# Patient Record
Sex: MALE | Race: WHITE | Employment: UNEMPLOYED | URBAN - METROPOLITAN AREA
[De-identification: names, ages, dates, MRNs, and addresses within clinical notes are randomized per-mention and may not be internally consistent; named-entity substitution may affect disease eponyms.]

---

## 2019-12-10 ENCOUNTER — OP HISTORICAL/CONVERTED ENCOUNTER (OUTPATIENT)
Dept: OTHER | Age: 64
End: 2019-12-10

## 2022-01-07 ENCOUNTER — HOSPITAL ENCOUNTER (EMERGENCY)
Age: 67
Discharge: HOME | End: 2022-01-08
Admitting: FAMILY MEDICINE
Payer: MEDICARE

## 2022-01-07 ENCOUNTER — APPOINTMENT (OUTPATIENT)
Dept: CT IMAGING | Age: 67
End: 2022-01-07
Attending: PHYSICIAN ASSISTANT
Payer: MEDICARE

## 2022-01-07 DIAGNOSIS — S09.90XA CLOSED HEAD INJURY, INITIAL ENCOUNTER: Primary | ICD-10-CM

## 2022-01-07 PROCEDURE — 72125 CT NECK SPINE W/O DYE: CPT

## 2022-01-07 PROCEDURE — 99284 EMERGENCY DEPT VISIT MOD MDM: CPT

## 2022-01-07 PROCEDURE — 70450 CT HEAD/BRAIN W/O DYE: CPT

## 2022-01-07 PROCEDURE — 96374 THER/PROPH/DIAG INJ IV PUSH: CPT

## 2022-01-07 PROCEDURE — 74011250636 HC RX REV CODE- 250/636: Performed by: PHYSICIAN ASSISTANT

## 2022-01-07 RX ORDER — MORPHINE SULFATE 4 MG/ML
4 INJECTION INTRAVENOUS ONCE
Status: COMPLETED | OUTPATIENT
Start: 2022-01-07 | End: 2022-01-07

## 2022-01-07 RX ADMIN — MORPHINE SULFATE 4 MG: 4 INJECTION INTRAVENOUS at 23:35

## 2022-01-07 NOTE — Clinical Note
[facility] EMERGENCY DEPT  [address] [address]  [phone]    Work/School Note    Date: 1/7/2022    To Whom It May concern:    Jose Magdaleno was seen and treated today in the emergency room by the following provider(s):  Physician Assistant: Valeria Bonilla PA-C. Jose Magdaleno is excused from work/school on 1/8/2022 through 1/10/2022. He is medically clear to return to work/school on 1/11/2022.          Sincerely,          Mack Correa PA-C

## 2022-01-08 ENCOUNTER — APPOINTMENT (OUTPATIENT)
Dept: GENERAL RADIOLOGY | Age: 67
End: 2022-01-08
Attending: PHYSICIAN ASSISTANT
Payer: MEDICARE

## 2022-01-08 VITALS
OXYGEN SATURATION: 99 % | HEIGHT: 73 IN | WEIGHT: 200 LBS | BODY MASS INDEX: 26.51 KG/M2 | RESPIRATION RATE: 16 BRPM | TEMPERATURE: 98.2 F | SYSTOLIC BLOOD PRESSURE: 125 MMHG | DIASTOLIC BLOOD PRESSURE: 86 MMHG | HEART RATE: 79 BPM

## 2022-01-08 PROCEDURE — 74011250637 HC RX REV CODE- 250/637: Performed by: PHYSICIAN ASSISTANT

## 2022-01-08 PROCEDURE — 73562 X-RAY EXAM OF KNEE 3: CPT

## 2022-01-08 PROCEDURE — 74011250636 HC RX REV CODE- 250/636: Performed by: PHYSICIAN ASSISTANT

## 2022-01-08 RX ORDER — KETOROLAC TROMETHAMINE 10 MG/1
10 TABLET, FILM COATED ORAL ONCE
Status: COMPLETED | OUTPATIENT
Start: 2022-01-08 | End: 2022-01-08

## 2022-01-08 RX ORDER — KETOROLAC TROMETHAMINE 10 MG/1
10 TABLET, FILM COATED ORAL
Qty: 12 TABLET | Refills: 0 | Status: SHIPPED | OUTPATIENT
Start: 2022-01-08

## 2022-01-08 RX ADMIN — KETOROLAC TROMETHAMINE 10 MG: 10 TABLET, FILM COATED ORAL at 03:56

## 2022-01-08 RX ADMIN — SODIUM CHLORIDE 1000 ML: 9 INJECTION, SOLUTION INTRAVENOUS at 01:29

## 2022-01-08 NOTE — ED NOTES
Bedside shift change report given to Osmany (oncoming nurse) by Halle Smith RN (offgoing nurse). Report included the following information SBAR, ED Summary, MAR and Recent Results.

## 2022-01-08 NOTE — ED TRIAGE NOTES
Patient arrived via EMS. Patient had a GLF and has admitted to ETOH this evening. Patient denies being on any blood thinners. Patient states that he is having neck pain and patient was placed in a c-collar from EMS due to this.

## 2022-01-10 NOTE — ED PROVIDER NOTES
EMERGENCY DEPARTMENT HISTORY AND PHYSICAL EXAM      Date: 1/7/2022  Patient Name: Rima Burnham    History of Presenting Illness     Chief Complaint   Patient presents with    Fall       History Provided By: Patient    HPI: Rima Burnham, 77 y.o. male with a past medical history significant No significant past medical history presents to the ED with cc of GLF tonight. Pt reports \"slipping\" and falling down 2 steps at home. He did hit his head but denies any LOC. Pt admits to [address] [place] [address],  [address]. Denies any blood thinners. Pt presents with complaints of neck pain and a headache. Denies any changes in vision, light headedness, dizziness, weakness, numbness, or radiating quality of pain. Pt denies treating sx's with anything and notes no modifying factors of pain. He states that he is otherwise well and has no further concerns. Pt specifically denies any chest pain, palpitations, shortness of breath, leg swelling, near-syncope, fever, chills, cough, sore throat, abdominal pain, nausea, vomit, diarrhea, diaphoresis, or rash. There are no other complaints, changes, or physical findings at this time. PCP: None    No current facility-administered medications on file prior to encounter. No current outpatient medications on file prior to encounter. Past History     Past Medical History:  No past medical history on file. Past Surgical History:  No past surgical history on file. Family History:  No family history on file. Social History:  Social History     Tobacco Use    Smoking status: Not on file    Smokeless tobacco: Not on file   Substance Use Topics    Alcohol use: Not on file    Drug use: Not on file       Allergies:  No Known Allergies      Review of Systems     Review of Systems   Constitutional: Negative. Negative for chills, diaphoresis and fever. HENT: Negative. Negative for congestion, rhinorrhea and sore throat. Eyes: Negative. Negative for pain.    Respiratory: Negative. Negative for cough, chest tightness, shortness of breath and wheezing. Cardiovascular: Negative. Negative for chest pain and palpitations. Gastrointestinal: Negative. Negative for abdominal pain, diarrhea, nausea and vomiting. Genitourinary: Negative. Negative for difficulty urinating, dysuria, flank pain, frequency and hematuria. Musculoskeletal: Positive for neck pain. Negative for back pain and gait problem. Skin: Negative. Negative for rash. Neurological: Positive for headaches. Negative for dizziness, syncope, weakness, light-headedness and numbness. Psychiatric/Behavioral: Negative. All other systems reviewed and are negative. Physical Exam     Physical Exam  Vitals and nursing note reviewed. Constitutional:       General: He is not in acute distress. Appearance: Normal appearance. He is not ill-appearing or toxic-appearing. HENT:      Head: Normocephalic. Contusion (right forehead) present. No raccoon eyes, Power's sign or laceration. Mouth/Throat:      Mouth: Mucous membranes are moist.   Eyes:      Extraocular Movements: Extraocular movements intact. Conjunctiva/sclera: Conjunctivae normal.      Pupils: Pupils are equal, round, and reactive to light. Cardiovascular:      Rate and Rhythm: Normal rate and regular rhythm. Pulses: Normal pulses. Heart sounds: Normal heart sounds, S1 normal and S2 normal. No murmur heard. No friction rub. No gallop. Pulmonary:      Effort: Pulmonary effort is normal. No respiratory distress. Breath sounds: Normal breath sounds. No wheezing, rhonchi or rales. Abdominal:      General: Bowel sounds are normal. There is no distension. Palpations: Abdomen is soft. Tenderness: There is no abdominal tenderness. There is no guarding or rebound. Musculoskeletal:      Cervical back: Neck supple. Tenderness (mildly over paraspinal musculature) present. No deformity, bony tenderness or crepitus. Thoracic back: Normal.      Lumbar back: Normal.      Right lower leg: No edema. Left lower leg: No edema. Comments: No step-offs or deformity   Skin:     General: Skin is warm and dry. Capillary Refill: Capillary refill takes less than 2 seconds. Findings: No rash. Neurological:      General: No focal deficit present. Mental Status: He is alert and oriented to person, place, and time. GCS: GCS eye subscore is 4. GCS verbal subscore is 5. GCS motor subscore is 6. Cranial Nerves: Cranial nerves are intact. Sensory: Sensation is intact. Motor: Motor function is intact. Gait: Gait is intact. Psychiatric:         Mood and Affect: Mood normal.         Behavior: Behavior normal.         Thought Content: Thought content normal.         Lab and Diagnostic Study Results     Labs -   No results found for this or any previous visit (from the past 12 hour(s)). Radiologic Studies -   @lastxrresult@  CT Results  (Last 48 hours)    None        CXR Results  (Last 48 hours)    None            Medical Decision Making   - I am the first provider for this patient. - I reviewed the vital signs, available nursing notes, past medical history, past surgical history, family history and social history. - Initial assessment performed. The patients presenting problems have been discussed, and they are in agreement with the care plan formulated and outlined with them. I have encouraged them to ask questions as they arise throughout their visit. Vital Signs-Reviewed the patient's vital signs. No data found.     Records Reviewed: Nursing Notes and Old Medical Records       Provider Notes (Medical Decision Making):     MDM  Number of Diagnoses or Management Options     Amount and/or Complexity of Data Reviewed  Tests in the radiology section of CPT®: ordered and reviewed  Tests in the medicine section of CPT®: ordered and reviewed  Review and summarize past medical records: yes ED Course:   Pt has been reassessed and updated on all results and findings. They have no additional complaints or concerns and all of their questions have been answered. Anticipate discharge home shortly. Procedures   Medical Decision Makingedical Decision Making  Performed by: Shamika Blancas PA-C  PROCEDURES:  Procedures       Disposition   Disposition: DC- Adult Discharges: All of the diagnostic tests were reviewed and questions answered. Diagnosis, care plan and treatment options were discussed. The patient understands the instructions and will follow up as directed. The patients results have been reviewed with them. They have been counseled regarding their diagnosis. The patient verbally convey understanding and agreement of the signs, symptoms, diagnosis, treatment and prognosis and additionally agrees to follow up as recommended with their PCP in 24 - 48 hours. They also agree with the care-plan and convey that all of their questions have been answered. I have also put together some discharge instructions for them that include: 1) educational information regarding their diagnosis, 2) how to care for their diagnosis at home, as well a 3) list of reasons why they would want to return to the ED prior to their follow-up appointment, should their condition change. Discharged    DISCHARGE PLAN:  1. Cannot display discharge medications since this patient is not currently admitted. 2.   Follow-up Information     Follow up With Specialties Details Why [address]   As needed [address] [place] [address]  [phone]        3. Return to ED if worse   4. Discharge Medication List as of 1/8/2022  3:38 AM            Diagnosis     Clinical Impression:   1.  Closed head injury, initial encounter        Attestations:    Hollie Correa PA-C    Please note that this dictation was completed with [employer], the computer voice recognition software. Quite often unanticipated grammatical, syntax, homophones, and other interpretive errors are inadvertently transcribed by the computer software. Please disregard these errors. Please excuse any errors that have escaped final proofreading. Thank you.

## 2022-04-02 ENCOUNTER — HOSPITAL ENCOUNTER (EMERGENCY)
Age: 67
Discharge: HOME | End: 2022-04-02
Attending: EMERGENCY MEDICINE
Payer: MEDICARE

## 2022-04-02 ENCOUNTER — APPOINTMENT (OUTPATIENT)
Dept: GENERAL RADIOLOGY | Age: 67
End: 2022-04-02
Attending: EMERGENCY MEDICINE
Payer: MEDICARE

## 2022-04-02 VITALS
DIASTOLIC BLOOD PRESSURE: 91 MMHG | HEIGHT: 73 IN | TEMPERATURE: 98.6 F | RESPIRATION RATE: 20 BRPM | OXYGEN SATURATION: 97 % | WEIGHT: 200 LBS | HEART RATE: 104 BPM | SYSTOLIC BLOOD PRESSURE: 146 MMHG | BODY MASS INDEX: 26.51 KG/M2

## 2022-04-02 DIAGNOSIS — R07.9 CHEST PAIN, UNSPECIFIED TYPE: Primary | ICD-10-CM

## 2022-04-02 LAB
ALBUMIN SERPL-MCNC: 3.9 G/DL (ref 3.5–5)
ALBUMIN/GLOB SERPL: 1 {RATIO} (ref 1.1–2.2)
ALP SERPL-CCNC: 90 U/L (ref 45–117)
ALT SERPL-CCNC: 66 U/L (ref 12–78)
ANION GAP SERPL CALC-SCNC: 10 MMOL/L (ref 5–15)
APTT PPP: 26.6 SEC (ref 22.1–31)
AST SERPL W P-5'-P-CCNC: 32 U/L (ref 15–37)
BASOPHILS # BLD: 0 K/UL (ref 0–0.1)
BASOPHILS NFR BLD: 0 % (ref 0–1)
BILIRUB SERPL-MCNC: 0.8 MG/DL (ref 0.2–1)
BUN SERPL-MCNC: 12 MG/DL (ref 6–20)
BUN/CREAT SERPL: ABNORMAL (ref 12–20)
CA-I BLD-MCNC: 9.1 MG/DL (ref 8.5–10.1)
CHLORIDE SERPL-SCNC: 101 MMOL/L (ref 97–108)
CO2 SERPL-SCNC: 24 MMOL/L (ref 21–32)
CREAT SERPL-MCNC: <0.15 MG/DL (ref 0.7–1.3)
D DIMER PPP FEU-MCNC: 0.58 MG/L FEU (ref 0.19–0.5)
DIFFERENTIAL METHOD BLD: ABNORMAL
EOSINOPHIL # BLD: 0.1 K/UL (ref 0–0.4)
EOSINOPHIL NFR BLD: 2 % (ref 0–7)
ERYTHROCYTE [DISTWIDTH] IN BLOOD BY AUTOMATED COUNT: 13.1 % (ref 11.5–14.5)
GLOBULIN SER CALC-MCNC: 3.8 G/DL (ref 2–4)
GLUCOSE SERPL-MCNC: 121 MG/DL (ref 65–100)
HCT VFR BLD AUTO: 40.3 % (ref 36.6–50.3)
HGB BLD-MCNC: 13.4 G/DL (ref 12.1–17)
IMM GRANULOCYTES # BLD AUTO: 0 K/UL (ref 0–0.04)
IMM GRANULOCYTES NFR BLD AUTO: 0 % (ref 0–0.5)
INR PPP: 1.1 (ref 0.9–1.1)
LYMPHOCYTES # BLD: 0.6 K/UL (ref 0.8–3.5)
LYMPHOCYTES NFR BLD: 7 % (ref 12–49)
MCH RBC QN AUTO: 30.5 PG (ref 26–34)
MCHC RBC AUTO-ENTMCNC: 33.3 G/DL (ref 30–36.5)
MCV RBC AUTO: 91.6 FL (ref 80–99)
MONOCYTES # BLD: 1 K/UL (ref 0–1)
MONOCYTES NFR BLD: 11 % (ref 5–13)
NEUTS SEG # BLD: 7 K/UL (ref 1.8–8)
NEUTS SEG NFR BLD: 80 % (ref 32–75)
PLATELET # BLD AUTO: 196 K/UL (ref 150–400)
PMV BLD AUTO: 9.4 FL (ref 8.9–12.9)
POTASSIUM SERPL-SCNC: 4 MMOL/L (ref 3.5–5.1)
PROT SERPL-MCNC: 7.7 G/DL (ref 6.4–8.2)
PROTHROMBIN TIME: 11.1 SEC (ref 9–11.1)
RBC # BLD AUTO: 4.4 M/UL (ref 4.1–5.7)
SODIUM SERPL-SCNC: 135 MMOL/L (ref 136–145)
THERAPEUTIC RANGE,PTTT: NORMAL SEC (ref 82–109)
TROPONIN-HIGH SENSITIVITY: 11 NG/L (ref 0–76)
TROPONIN-HIGH SENSITIVITY: 14 NG/L (ref 0–76)
WBC # BLD AUTO: 8.8 K/UL (ref 4.1–11.1)

## 2022-04-02 PROCEDURE — 85025 COMPLETE CBC W/AUTO DIFF WBC: CPT

## 2022-04-02 PROCEDURE — 74011250636 HC RX REV CODE- 250/636: Performed by: EMERGENCY MEDICINE

## 2022-04-02 PROCEDURE — 84484 ASSAY OF TROPONIN QUANT: CPT

## 2022-04-02 PROCEDURE — 80053 COMPREHEN METABOLIC PANEL: CPT

## 2022-04-02 PROCEDURE — 85610 PROTHROMBIN TIME: CPT

## 2022-04-02 PROCEDURE — 96374 THER/PROPH/DIAG INJ IV PUSH: CPT

## 2022-04-02 PROCEDURE — 71046 X-RAY EXAM CHEST 2 VIEWS: CPT

## 2022-04-02 PROCEDURE — 93005 ELECTROCARDIOGRAM TRACING: CPT

## 2022-04-02 PROCEDURE — 85730 THROMBOPLASTIN TIME PARTIAL: CPT

## 2022-04-02 PROCEDURE — 36415 COLL VENOUS BLD VENIPUNCTURE: CPT

## 2022-04-02 PROCEDURE — 99285 EMERGENCY DEPT VISIT HI MDM: CPT

## 2022-04-02 PROCEDURE — 85379 FIBRIN DEGRADATION QUANT: CPT

## 2022-04-02 RX ORDER — BUPROPION HYDROCHLORIDE 150 MG/1
TABLET, EXTENDED RELEASE ORAL
COMMUNITY
Start: 2022-02-01

## 2022-04-02 RX ORDER — LISINOPRIL 20 MG/1
TABLET ORAL
COMMUNITY
Start: 2022-02-01

## 2022-04-02 RX ORDER — FLUTICASONE PROPIONATE 50 MCG
SPRAY, SUSPENSION (ML) NASAL
COMMUNITY
Start: 2022-02-22

## 2022-04-02 RX ORDER — KETOROLAC TROMETHAMINE 30 MG/ML
15 INJECTION, SOLUTION INTRAMUSCULAR; INTRAVENOUS
Status: COMPLETED | OUTPATIENT
Start: 2022-04-02 | End: 2022-04-02

## 2022-04-02 RX ORDER — ATORVASTATIN CALCIUM 40 MG/1
TABLET, FILM COATED ORAL
COMMUNITY
Start: 2022-02-01

## 2022-04-02 RX ADMIN — KETOROLAC TROMETHAMINE 15 MG: 30 INJECTION, SOLUTION INTRAMUSCULAR; INTRAVENOUS at 08:47

## 2022-04-02 NOTE — DISCHARGE INSTRUCTIONS
Thank you! Thank you for allowing me to care for you in the emergency department. I sincerely hope that you are satisfied with your visit today. It is my goal to provide you with excellent care. Below you will find a list of your labs and imaging from your visit today. Should you have any questions regarding these results please do not hesitate to call the emergency department. Labs -     Recent Results (from the past 12 hour(s))   PTT    Collection Time: 04/02/22  7:30 AM   Result Value Ref Range    aPTT 26.6 22.1 - 31.0 sec    aPTT, therapeutic range   82 - 109 sec   PROTHROMBIN TIME + INR    Collection Time: 04/02/22  7:30 AM   Result Value Ref Range    Prothrombin time 11.1 9.0 - 11.1 sec    INR 1.1 0.9 - 1.1     TROPONIN-HIGH SENSITIVITY    Collection Time: 04/02/22  7:35 AM   Result Value Ref Range    Troponin-High Sensitivity 14 0 - 76 ng/L   CBC WITH AUTOMATED DIFF    Collection Time: 04/02/22  7:40 AM   Result Value Ref Range    WBC 8.8 4.1 - 11.1 K/uL    RBC 4.40 4. 10 - 5.70 M/uL    HGB 13.4 12.1 - 17.0 g/dL    HCT 40.3 36.6 - 50.3 %    MCV 91.6 80.0 - 99.0 FL    MCH 30.5 26.0 - 34.0 PG    MCHC 33.3 30.0 - 36.5 g/dL    RDW 13.1 11.5 - 14.5 %    PLATELET 371 427 - 819 K/uL    MPV 9.4 8.9 - 12.9 FL    NEUTROPHILS 80 (H) 32 - 75 %    LYMPHOCYTES 7 (L) 12 - 49 %    MONOCYTES 11 5 - 13 %    EOSINOPHILS 2 0 - 7 %    BASOPHILS 0 0 - 1 %    IMMATURE GRANULOCYTES 0 0.0 - 0.5 %    ABS. NEUTROPHILS 7.0 1.8 - 8.0 K/UL    ABS. LYMPHOCYTES 0.6 (L) 0.8 - 3.5 K/UL    ABS. MONOCYTES 1.0 0.0 - 1.0 K/UL    ABS. EOSINOPHILS 0.1 0.0 - 0.4 K/UL    ABS. BASOPHILS 0.0 0.0 - 0.1 K/UL    ABS. IMM.  GRANS. 0.0 0.00 - 0.04 K/UL    DF AUTOMATED     METABOLIC PANEL, COMPREHENSIVE    Collection Time: 04/02/22  7:40 AM   Result Value Ref Range    Sodium 135 (L) 136 - 145 mmol/L    Potassium 4.0 3.5 - 5.1 mmol/L    Chloride 101 97 - 108 mmol/L    CO2 24 21 - 32 mmol/L    Anion gap 10 5 - 15 mmol/L    Glucose 121 (H) 65 - 100 mg/dL    BUN 12 6 - 20 mg/dL    Creatinine <0.15 (L) 0.70 - 1.30 mg/dL    BUN/Creatinine ratio Not calculated 12 - 20      GFR est AA Not calculated >60 ml/min/1.73m2    GFR est non-AA Not calculated >60 ml/min/1.73m2    Calcium 9.1 8.5 - 10.1 mg/dL    Bilirubin, total 0.8 0.2 - 1.0 mg/dL    AST (SGOT) 32 15 - 37 U/L    ALT (SGPT) 66 12 - 78 U/L    Alk. phosphatase 90 45 - 117 U/L    Protein, total 7.7 6.4 - 8.2 g/dL    Albumin 3.9 3.5 - 5.0 g/dL    Globulin 3.8 2.0 - 4.0 g/dL    A-G Ratio 1.0 (L) 1.1 - 2.2     D DIMER    Collection Time: 04/02/22  7:40 AM   Result Value Ref Range    D-dimer 0.58 (H) 0.19 - 0.50 mg/L FEU   TROPONIN-HIGH SENSITIVITY    Collection Time: 04/02/22  9:00 AM   Result Value Ref Range    Troponin-High Sensitivity 11 0 - 76 ng/L       Radiologic Studies -   XR CHEST PA LAT   Final Result   No plain film evidence for CHF or pneumonia. CT Results  (Last 48 hours)      None          CXR Results  (Last 48 hours)                 04/02/22 0727  XR CHEST PA LAT Final result    Impression:  No plain film evidence for CHF or pneumonia. Narrative:  Chest 2 views. Lungs clear; no interstitial or alveolar pulmonary edema. Cardiac and   mediastinal structures within normal limits. No pneumothorax or sizable pleural   effusion. Thoracic spondylosis. If you feel that you have not received excellent quality care or timely care, please ask to speak to the nurse manager. Please choose us in the future for your continued health care needs. ------------------------------------------------------------------------------------------------------------  The exam and treatment you received in the Emergency Department were for an urgent problem and are not intended as complete care.  It is important that you follow-up with a doctor, nurse practitioner, or physician assistant to:  (1) confirm your diagnosis,  (2) re-evaluation of changes in your illness and treatment, and (3) for ongoing care. If your symptoms become worse or you do not improve as expected and you are unable to reach your usual health care provider, you should return to the Emergency Department. We are available 24 hours a day. Please take your discharge instructions with you when you go to your follow-up appointment. If you have any problem arranging a follow-up appointment, contact the Emergency Department immediately. If a prescription has been provided, please have it filled as soon as possible to prevent a delay in treatment. Read the entire medication instruction sheet provided to you by the pharmacy. If you have any questions or reservations about taking the medication due to side effects or interactions with other medications, please call your primary care physician or contact the ER to speak with the charge nurse. Make an appointment with your family doctor or the physician you were referred to for follow-up of this visit as instructed on your discharge paperwork, as this is a mandatory follow-up. Return to the ER if you are unable to be seen or if you are unable to be seen in a timely manner. If you have any problem arranging the follow-up visit, contact the Emergency Department immediately.

## 2022-04-02 NOTE — ED PROVIDER NOTES
EMERGENCY DEPARTMENT HISTORY AND PHYSICAL EXAM      Date: 4/2/2022  Patient Name: Mick Buckner      History of Presenting Illness     Chief Complaint   Patient presents with    Chest Pain       History Provided By: Patient    HPI: Mick Buckner, 79 y.o. male with a past medical history significant hypertension and malignancy presents to the ED with cc of sharp, substernal chest pain starting approximately 1 hour prior to arrival.  Patient states that the pain awoke him from sleep. He does endorse recent cough over the course the last week. Patient reports associated central back pain as well as shortness of breath. Patient with a history of prostate cancer, however does not report history of previous DVT or PE. Patient reports pain is made worse with palpation and cough. There are no other complaints, changes, or physical findings at this time. PCP: None    Current Outpatient Medications   Medication Sig Dispense Refill    atorvastatin (LIPITOR) 40 mg tablet       buPROPion SR (WELLBUTRIN SR) 150 mg SR tablet       fluticasone propionate (FLONASE) 50 mcg/actuation nasal spray       lisinopriL (PRINIVIL, ZESTRIL) 20 mg tablet       ketorolac (TORADOL) 10 mg tablet Take 1 Tablet by mouth every six (6) hours as needed for Pain. 12 Tablet 0       Past History     Past Medical History:  Past Medical History:   Diagnosis Date    Cancer ([address]     Hypertension        Past Surgical History:  History reviewed. No pertinent surgical history. Family History:  History reviewed. No pertinent family history. Social History:  Social History     Tobacco Use    Smoking status: Former Smoker    Smokeless tobacco: Never Used    Tobacco comment: smoked 50 years ago   Substance Use Topics    Alcohol use: Not on file    Drug use: Not on file       Allergies:  No Known Allergies      Review of Systems     Review of Systems   Constitutional: Negative for chills and fever.    HENT: Negative for congestion and rhinorrhea. Eyes: Negative for photophobia and visual disturbance. Respiratory: Positive for cough. Negative for shortness of breath. Cardiovascular: Positive for chest pain and palpitations. Negative for leg swelling. Gastrointestinal: Negative for abdominal pain, diarrhea, nausea and vomiting. Genitourinary: Negative for difficulty urinating and dysuria. Musculoskeletal: Negative for arthralgias and myalgias. Skin: Negative for color change and rash. Neurological: Negative for weakness and headaches. Psychiatric/Behavioral: Negative for dysphoric mood and sleep disturbance. Physical Exam     Physical Exam  Constitutional:       General: He is not in acute distress. Appearance: Normal appearance. He is not ill-appearing. HENT:      Head: Normocephalic and atraumatic. Right Ear: External ear normal.      Left Ear: External ear normal.      Nose: Nose normal.      Mouth/Throat:      Mouth: Mucous membranes are moist.   Eyes:      Extraocular Movements: Extraocular movements intact. Conjunctiva/sclera: Conjunctivae normal.      Pupils: Pupils are equal, round, and reactive to light. Cardiovascular:      Rate and Rhythm: Regular rhythm. Tachycardia present. Pulses: Normal pulses. Pulmonary:      Effort: Pulmonary effort is normal. No respiratory distress. Breath sounds: Normal breath sounds. Chest:      Chest wall: Tenderness present. Abdominal:      General: Abdomen is flat. There is no distension. Musculoskeletal:         General: Normal range of motion. Cervical back: Normal range of motion. Skin:     General: Skin is warm and dry. Neurological:      General: No focal deficit present. Mental Status: He is alert and oriented to person, place, and time. Psychiatric:         Mood and Affect: Mood normal.         Behavior: Behavior normal.         Thought Content:  Thought content normal.         Judgment: Judgment normal.         Lab and Diagnostic Study Results     Labs -     Recent Results (from the past 12 hour(s))   PTT    Collection Time: 04/02/22  7:30 AM   Result Value Ref Range    aPTT 26.6 22.1 - 31.0 sec    aPTT, therapeutic range   82 - 109 sec   PROTHROMBIN TIME + INR    Collection Time: 04/02/22  7:30 AM   Result Value Ref Range    Prothrombin time 11.1 9.0 - 11.1 sec    INR 1.1 0.9 - 1.1     TROPONIN-HIGH SENSITIVITY    Collection Time: 04/02/22  7:35 AM   Result Value Ref Range    Troponin-High Sensitivity 14 0 - 76 ng/L   CBC WITH AUTOMATED DIFF    Collection Time: 04/02/22  7:40 AM   Result Value Ref Range    WBC 8.8 4.1 - 11.1 K/uL    RBC 4.40 4. 10 - 5.70 M/uL    HGB 13.4 12.1 - 17.0 g/dL    HCT 40.3 36.6 - 50.3 %    MCV 91.6 80.0 - 99.0 FL    MCH 30.5 26.0 - 34.0 PG    MCHC 33.3 30.0 - 36.5 g/dL    RDW 13.1 11.5 - 14.5 %    PLATELET 151 826 - 278 K/uL    MPV 9.4 8.9 - 12.9 FL    NEUTROPHILS 80 (H) 32 - 75 %    LYMPHOCYTES 7 (L) 12 - 49 %    MONOCYTES 11 5 - 13 %    EOSINOPHILS 2 0 - 7 %    BASOPHILS 0 0 - 1 %    IMMATURE GRANULOCYTES 0 0.0 - 0.5 %    ABS. NEUTROPHILS 7.0 1.8 - 8.0 K/UL    ABS. LYMPHOCYTES 0.6 (L) 0.8 - 3.5 K/UL    ABS. MONOCYTES 1.0 0.0 - 1.0 K/UL    ABS. EOSINOPHILS 0.1 0.0 - 0.4 K/UL    ABS. BASOPHILS 0.0 0.0 - 0.1 K/UL    ABS. IMM. GRANS. 0.0 0.00 - 0.04 K/UL    DF AUTOMATED     METABOLIC PANEL, COMPREHENSIVE    Collection Time: 04/02/22  7:40 AM   Result Value Ref Range    Sodium 135 (L) 136 - 145 mmol/L    Potassium 4.0 3.5 - 5.1 mmol/L    Chloride 101 97 - 108 mmol/L    CO2 24 21 - 32 mmol/L    Anion gap 10 5 - 15 mmol/L    Glucose 121 (H) 65 - 100 mg/dL    BUN 12 6 - 20 mg/dL    Creatinine <0.15 (L) 0.70 - 1.30 mg/dL    BUN/Creatinine ratio Not calculated 12 - 20      GFR est AA Not calculated >60 ml/min/1.73m2    GFR est non-AA Not calculated >60 ml/min/1.73m2    Calcium 9.1 8.5 - 10.1 mg/dL    Bilirubin, total 0.8 0.2 - 1.0 mg/dL    AST (SGOT) 32 15 - 37 U/L    ALT (SGPT) 66 12 - 78 U/L    Alk. phosphatase 90 45 - 117 U/L    Protein, total 7.7 6.4 - 8.2 g/dL    Albumin 3.9 3.5 - 5.0 g/dL    Globulin 3.8 2.0 - 4.0 g/dL    A-G Ratio 1.0 (L) 1.1 - 2.2     D DIMER    Collection Time: 04/02/22  7:40 AM   Result Value Ref Range    D-dimer 0.58 (H) 0.19 - 0.50 mg/L FEU   TROPONIN-HIGH SENSITIVITY    Collection Time: 04/02/22  9:00 AM   Result Value Ref Range    Troponin-High Sensitivity 11 0 - 76 ng/L       Radiologic Studies -   [unfilled]  CT Results  (Last 48 hours)    None        CXR Results  (Last 48 hours)               04/02/22 0727  XR CHEST PA LAT Final result    Impression:  No plain film evidence for CHF or pneumonia. Narrative:  Chest 2 views. Lungs clear; no interstitial or alveolar pulmonary edema. Cardiac and   mediastinal structures within normal limits. No pneumothorax or sizable pleural   effusion. Thoracic spondylosis. Medical Decision Making and ED Course   - I am the first and primary provider for this patient AND AM THE PRIMARY PROVIDER OF RECORD. - I reviewed the vital signs, available nursing notes, past medical history, past surgical history, family history and social history. - Initial assessment performed. The patients presenting problems have been discussed, and the staff are in agreement with the care plan formulated and outlined with them. I have encouraged them to ask questions as they arise throughout their visit. Vital Signs-Reviewed the patient's vital signs. Patient Vitals for the past 12 hrs:   Temp Pulse Resp BP SpO2   04/02/22 1002 -- (!) 104 -- (!) 146/91 97 %   04/02/22 0844 -- (!) 106 20 (!) 157/93 --   04/02/22 0702 98.6 °F (37 °C) (!) 109 20 (!) 168/94 98 %       EKG interpretation: (Preliminary): Performed at 0658, and read at 0705  Sinus tachycardia with a ventricular rate of 113, , QRS 74, QTc 452.   Left axis deviation      Records Reviewed: Nursing Notes    The patient presents with chest pain with a differential diagnosis of  ACS, angina, chest wall pain, costochondritis, GERD, pnuemonia and PE    ED Course:              Provider Notes (Medical Decision Making):   Patient is a 42-year-old male with past medical history significant for hypertension and prostate cancer presenting to the ED for evaluation of sharp, substernal chest pain starting to prior to arrival.      On physical examination, patient is resting comfortably in bed. Anterior chest wall is tender to palpation. Lung sounds are clear to auscultation bilaterally. Patient is tachycardic. He has no lower extremity swelling. Given patient's age and history of malignancy he does not PERC out and is moderate risk by Wells criteria for PE. Patient will be assessed with CBC, CMP, coags, EKG, chest x-ray, troponin, D-dimer. Age-adjusted D-dimer is negative. Troponin x2 within normal limits. No electrolyte abnormalities. Chest x-ray clear    Suspect musculoskeletal etiology of patient's symptoms given reproducibility on palpation and recent cough  MDM           Consultations:       Consultations: - NONE        Procedures and Critical Care       Performed by: Jesus Landrum DO  PROCEDURES:  Procedures       Disposition     Disposition: Condition stable  DC- Adult Discharges: All of the diagnostic tests were reviewed and questions answered. Diagnosis, care plan and treatment options were discussed. The patient understands the instructions and will follow up as directed. The patients results have been reviewed with them. They have been counseled regarding their diagnosis. The patient verbally convey understanding and agreement of the signs, symptoms, diagnosis, treatment and prognosis and additionally agrees to follow up as recommended with their PCP in 24 - 48 hours. They also agree with the care-plan and convey that all of their questions have been answered.   I have also put together some discharge instructions for them that include: 1) educational information regarding their diagnosis, 2) how to care for their diagnosis at home, as well a 3) list of reasons why they would want to return to the ED prior to their follow-up appointment, should their condition change. DC-The patient was given verbal chest pain warning signs and instructions        Remove if not discharged  DISCHARGE PLAN:  1. Current Discharge Medication List      CONTINUE these medications which have NOT CHANGED    Details   atorvastatin (LIPITOR) 40 mg tablet       buPROPion SR (WELLBUTRIN SR) 150 mg SR tablet       fluticasone propionate (FLONASE) 50 mcg/actuation nasal spray       lisinopriL (PRINIVIL, ZESTRIL) 20 mg tablet       ketorolac (TORADOL) 10 mg tablet Take 1 Tablet by mouth every six (6) hours as needed for Pain. Qty: 12 Tablet, Refills: 0           2. Follow-up Information     Follow up With Specialties Details Why Contact Info    [address] [place] [address] Emergency Medicine  As needed, If symptoms worsen [address] [place] [id number] [phone]        3. Return to ED if worse   4. Discharge Medication List as of 4/2/2022  9:51 AM          Diagnosis     Clinical Impression:   1. Chest pain, unspecified type        Attestations:    Tessa Call, DO    Please note that this dictation was completed with [employer], the computer voice recognition software. Quite often unanticipated grammatical, syntax, homophones, and other interpretive errors are inadvertently transcribed by the computer software. Please disregard these errors. Please excuse any errors that have escaped final proofreading. Thank you.

## 2022-04-02 NOTE — ED NOTES
Pt given d/c instructions, voices understanding, pain only when coughs now, Toradol decreased pain/  D/c'd home.

## 2022-04-04 LAB
ATRIAL RATE: 113 BPM
CALCULATED P AXIS, ECG09: 61 DEGREES
CALCULATED R AXIS, ECG10: -35 DEGREES
CALCULATED T AXIS, ECG11: 56 DEGREES
DIAGNOSIS, 93000: NORMAL
P-R INTERVAL, ECG05: 186 MS
Q-T INTERVAL, ECG07: 330 MS
QRS DURATION, ECG06: 74 MS
QTC CALCULATION (BEZET), ECG08: 452 MS
VENTRICULAR RATE, ECG03: 113 BPM

## 2022-06-03 ENCOUNTER — TRANSCRIBE ORDER (OUTPATIENT)
Dept: SCHEDULING | Age: 67
End: 2022-06-03

## 2022-06-03 DIAGNOSIS — C61 MALIGNANT NEOPLASM OF PROSTATE (HCC): Primary | ICD-10-CM

## 2024-08-27 ENCOUNTER — HOSPITAL ENCOUNTER (EMERGENCY)
Facility: HOSPITAL | Age: 69
Discharge: HOME | End: 2024-08-27
Attending: EMERGENCY MEDICINE
Payer: MEDICARE

## 2024-08-27 ENCOUNTER — APPOINTMENT (OUTPATIENT)
Facility: HOSPITAL | Age: 69
End: 2024-08-27
Payer: MEDICARE

## 2024-08-27 VITALS
HEIGHT: 72 IN | RESPIRATION RATE: 18 BRPM | HEART RATE: 81 BPM | SYSTOLIC BLOOD PRESSURE: 140 MMHG | OXYGEN SATURATION: 98 % | BODY MASS INDEX: 27.77 KG/M2 | TEMPERATURE: 98.1 F | DIASTOLIC BLOOD PRESSURE: 84 MMHG | WEIGHT: 205 LBS

## 2024-08-27 DIAGNOSIS — U07.1 COVID-19: Primary | ICD-10-CM

## 2024-08-27 LAB
FLUAV RNA SPEC QL NAA+PROBE: NOT DETECTED
FLUBV RNA SPEC QL NAA+PROBE: NOT DETECTED
SARS-COV-2 RNA RESP QL NAA+PROBE: DETECTED

## 2024-08-27 PROCEDURE — 99284 EMERGENCY DEPT VISIT MOD MDM: CPT

## 2024-08-27 PROCEDURE — 87636 SARSCOV2 & INF A&B AMP PRB: CPT

## 2024-08-27 PROCEDURE — 71046 X-RAY EXAM CHEST 2 VIEWS: CPT

## 2024-08-27 RX ORDER — IBUPROFEN 600 MG/1
600 TABLET, FILM COATED ORAL 3 TIMES DAILY PRN
Qty: 30 TABLET | Refills: 0 | Status: SHIPPED | OUTPATIENT
Start: 2024-08-27

## 2024-08-27 ASSESSMENT — PAIN - FUNCTIONAL ASSESSMENT: PAIN_FUNCTIONAL_ASSESSMENT: 0-10

## 2024-08-27 ASSESSMENT — PAIN SCALES - GENERAL: PAINLEVEL_OUTOF10: 9

## 2024-08-27 NOTE — ED PROVIDER NOTES
[facility] EMERGENCY DEPARTMENT  EMERGENCY DEPARTMENT HISTORY AND PHYSICAL EXAM      Date: 8/27/2024  Patient Name: Slava Husain      History of Presenting Illness       Chief Complaint   Patient presents with    Generalized Body Aches    Cough    Headache       History was provided by: Patient    Location/Duration/Severity/Modifying factors     Slava Husain is a 69 y.o. male who arrived to the emergency department by by private vehicle with complaints of Generalized Body Aches, Cough, and Headache        Patient is a 69-year-old male who has a past medical history of hypertension, hyperlipidemia presenting to the emergency room with a chief complaint of generalized body aches, dry cough, frontal headache.  He says his symptoms started on Sunday with cough, he developed chills and bodyaches yesterday.  He denies a fever documented at home.  He notes he received updated immunization for his pneumococcal vaccine and shingles shot at the end of last week.  He is followed by primary care doctor he took some ibuprofen this morning before coming in.          There are no other complaints, changes, or physical findings at this time.    PCP: No primary care provider on file.    No current facility-administered medications for this encounter.     Current Outpatient Medications   Medication Sig Dispense Refill    nirmatrelvir/ritonavir 300/100 (PAXLOVID, 300/100,) 20 x 150 MG & 10 x 100MG TBPK Take 3 tablets (two 150 mg nirmatrelvir and one 100 mg ritonavir tablets) by mouth every 12 hours for 5 days. 30 tablet 0    ibuprofen (ADVIL;MOTRIN) 600 MG tablet Take 1 tablet by mouth 3 times daily as needed for Pain 30 tablet 0    atorvastatin (LIPITOR) 40 MG tablet ceived the following from [facility]: Outside name: atorvastatin (LIPITOR) 40 mg tablet      buPROPion (WELLBUTRIN SR) 150 MG extended release tablet ceived the following from [facility]: Outside name: buPROPion SR (WELLBUTRIN SR) 150 mg  to immediately return with any new symptoms or worsening of current condition. Any Incidental findings were noted on the patient's discharge paperwork as well as verbally directly to the patient, and the appropriate follow-up was given to the patient as far as instructions on testing needed as well as the timeframe.  All questions have been answered.  Patient is given educational material regarding their diagnoses, including danger symptoms and when to return to the ED.       Diagnosis     Clinical Impression:   1. COVID-19        PATIENT REFERRED TO:  Your Primary Care Physician  Call your primary care/family medicine provider to set up a follow-up visit to discuss the care in the emergency room today.  Call in 2 days      [facility] EMERGENCY DEPARTMENT  [address] [facility] [id number] [phone]    As needed, If symptoms worsen      DISCHARGE MEDICATIONS:  New Prescriptions    IBUPROFEN (ADVIL;MOTRIN) 600 MG TABLET    Take 1 tablet by mouth 3 times daily as needed for Pain    NIRMATRELVIR/RITONAVIR 300/100 (PAXLOVID, 300/100,) 20 X 150 MG & 10 X 100MG TBPK    Take 3 tablets (two 150 mg nirmatrelvir and one 100 mg ritonavir tablets) by mouth every 12 hours for 5 days.       Attestations:    Nasim Diez DO    Please note that this dictation was completed with [employer], the computer voice recognition software.  Quite often unanticipated grammatical, syntax, homophones, and other interpretive errors are inadvertently transcribed by the computer software.  Please disregard these errors.  Please excuse any errors that have escaped final proofreading.  Thank you.         Nasim Deiz DO  08/27/24 6451

## 2024-08-27 NOTE — ED TRIAGE NOTES
Pt states that he came down with a bad cough Sunday. States it feels like his head is about to blow up. Body aches, h/a and cough.

## 2024-08-27 NOTE — DISCHARGE INSTRUCTIONS
While taking the Paxlovid medicine for COVID, you should hold your atorvastatin and do not take it until you have completed treatment, and either take half of the dose of trazodone or stop taking it.  The risk of interaction with trazodone would be excessive sedation.  It would be safest to forego taking it while you are on the Paxlovid.    Red Flag Symptoms/Reasons to return: Although your vital signs and oxygen levels are normal today and you may have mild or moderate symptoms, the course of COVID illness and illness in general can be variable and may progress. If you experience worsening including significant shortness of breath, passing out, severe chest pain beyond merely pain with coughing, changes in mental status, confusion, uncontrolled fever, inability to keep liquids down, severe abdominal pain or any worsening you should return.      Interim Guidance for Preventing the Spread of Coronavirus Disease 2019 (COVID-19) in Homes and Residential Communities  Prevention steps for:  People with confirmed or suspected COVID-19 (including persons under investigation) who do not need to be hospitalized  and  People with confirmed COVID-19 who were hospitalized and determined to be medically stable to go home  Your healthcare provider and public health staff will evaluate whether you can be cared for at home. If it is determined that you do not need to be hospitalized and can be isolated at home, you will be monitored by staff from your local or state health department. You should follow the prevention steps below until a healthcare provider or local or state health department says you can return to your normal activities.  Stay home except to get medical care  You should restrict activities outside your home, except for getting medical care. Do not go to work, school, or public areas. Avoid using public transportation, ride-sharing, or taxis.  Separate yourself from other people and animals in your home  People: As    Content source: National Center for Immunization and Respiratory Diseases (NCIRD), Division of Viral Diseases

## 2025-01-27 ENCOUNTER — APPOINTMENT (OUTPATIENT)
Facility: HOSPITAL | Age: 70
End: 2025-01-27
Payer: MEDICARE

## 2025-01-27 ENCOUNTER — HOSPITAL ENCOUNTER (EMERGENCY)
Facility: HOSPITAL | Age: 70
Discharge: HOME | End: 2025-01-27
Attending: EMERGENCY MEDICINE
Payer: MEDICARE

## 2025-01-27 VITALS
RESPIRATION RATE: 16 BRPM | SYSTOLIC BLOOD PRESSURE: 165 MMHG | HEIGHT: 72 IN | DIASTOLIC BLOOD PRESSURE: 98 MMHG | BODY MASS INDEX: 30.48 KG/M2 | OXYGEN SATURATION: 99 % | HEART RATE: 76 BPM | TEMPERATURE: 97.9 F | WEIGHT: 225 LBS

## 2025-01-27 DIAGNOSIS — K63.89 EPIPLOIC APPENDAGITIS: Primary | ICD-10-CM

## 2025-01-27 LAB
ALBUMIN SERPL-MCNC: 4 G/DL (ref 3.5–5)
ALBUMIN/GLOB SERPL: 1.1 (ref 1.1–2.2)
ALP SERPL-CCNC: 85 U/L (ref 45–117)
ALT SERPL-CCNC: 41 U/L (ref 12–78)
ANION GAP SERPL CALC-SCNC: 11 MMOL/L (ref 2–12)
AST SERPL W P-5'-P-CCNC: 21 U/L (ref 15–37)
BASOPHILS # BLD: 0.02 K/UL (ref 0–0.1)
BASOPHILS NFR BLD: 0.4 % (ref 0–1)
BILIRUB SERPL-MCNC: 1.4 MG/DL (ref 0.2–1)
BNP SERPL-MCNC: 179 PG/ML
BUN SERPL-MCNC: 12 MG/DL (ref 6–20)
BUN/CREAT SERPL: 11 (ref 12–20)
CA-I BLD-MCNC: 9 MG/DL (ref 8.5–10.1)
CHLORIDE SERPL-SCNC: 102 MMOL/L (ref 97–108)
CO2 SERPL-SCNC: 28 MMOL/L (ref 21–32)
CREAT SERPL-MCNC: 1.14 MG/DL (ref 0.7–1.3)
DIFFERENTIAL METHOD BLD: NORMAL
EKG ATRIAL RATE: 78 BPM
EKG DIAGNOSIS: NORMAL
EKG P AXIS: 56 DEGREES
EKG P-R INTERVAL: 226 MS
EKG Q-T INTERVAL: 372 MS
EKG QRS DURATION: 78 MS
EKG QTC CALCULATION (BAZETT): 424 MS
EKG R AXIS: -29 DEGREES
EKG T AXIS: 27 DEGREES
EKG VENTRICULAR RATE: 78 BPM
EOSINOPHIL # BLD: 0.11 K/UL (ref 0–0.4)
EOSINOPHIL NFR BLD: 2.1 % (ref 0–7)
ERYTHROCYTE [DISTWIDTH] IN BLOOD BY AUTOMATED COUNT: 13.9 % (ref 11.5–14.5)
GLOBULIN SER CALC-MCNC: 3.5 G/DL (ref 2–4)
GLUCOSE SERPL-MCNC: 111 MG/DL (ref 65–100)
HCT VFR BLD AUTO: 40.9 % (ref 36.6–50.3)
HGB BLD-MCNC: 13.2 G/DL (ref 12.1–17)
IMM GRANULOCYTES # BLD AUTO: 0.02 K/UL (ref 0–0.04)
IMM GRANULOCYTES NFR BLD AUTO: 0.4 % (ref 0–0.5)
LYMPHOCYTES # BLD: 0.89 K/UL (ref 0.8–3.5)
LYMPHOCYTES NFR BLD: 16.7 % (ref 12–49)
MCH RBC QN AUTO: 29.6 PG (ref 26–34)
MCHC RBC AUTO-ENTMCNC: 32.3 G/DL (ref 30–36.5)
MCV RBC AUTO: 91.7 FL (ref 80–99)
MONOCYTES # BLD: 0.58 K/UL (ref 0–1)
MONOCYTES NFR BLD: 10.9 % (ref 5–13)
NEUTS SEG # BLD: 3.71 K/UL (ref 1.8–8)
NEUTS SEG NFR BLD: 69.5 % (ref 32–75)
PLATELET # BLD AUTO: 182 K/UL (ref 150–400)
PMV BLD AUTO: 9.6 FL (ref 8.9–12.9)
POTASSIUM SERPL-SCNC: 3.9 MMOL/L (ref 3.5–5.1)
PROT SERPL-MCNC: 7.5 G/DL (ref 6.4–8.2)
RBC # BLD AUTO: 4.46 M/UL (ref 4.1–5.7)
SODIUM SERPL-SCNC: 141 MMOL/L (ref 136–145)
TROPONIN I SERPL HS-MCNC: 12 NG/L (ref 0–76)
WBC # BLD AUTO: 5.3 K/UL (ref 4.1–11.1)

## 2025-01-27 PROCEDURE — 99285 EMERGENCY DEPT VISIT HI MDM: CPT

## 2025-01-27 PROCEDURE — 80053 COMPREHEN METABOLIC PANEL: CPT

## 2025-01-27 PROCEDURE — 36415 COLL VENOUS BLD VENIPUNCTURE: CPT

## 2025-01-27 PROCEDURE — 6360000004 HC RX CONTRAST MEDICATION: Performed by: EMERGENCY MEDICINE

## 2025-01-27 PROCEDURE — 6370000000 HC RX 637 (ALT 250 FOR IP): Performed by: EMERGENCY MEDICINE

## 2025-01-27 PROCEDURE — 84484 ASSAY OF TROPONIN QUANT: CPT

## 2025-01-27 PROCEDURE — 71275 CT ANGIOGRAPHY CHEST: CPT

## 2025-01-27 PROCEDURE — 83880 ASSAY OF NATRIURETIC PEPTIDE: CPT

## 2025-01-27 PROCEDURE — 85025 COMPLETE CBC W/AUTO DIFF WBC: CPT

## 2025-01-27 PROCEDURE — 93005 ELECTROCARDIOGRAM TRACING: CPT | Performed by: EMERGENCY MEDICINE

## 2025-01-27 PROCEDURE — 74177 CT ABD & PELVIS W/CONTRAST: CPT

## 2025-01-27 RX ORDER — OXYCODONE AND ACETAMINOPHEN 5; 325 MG/1; MG/1
1 TABLET ORAL EVERY 6 HOURS PRN
Qty: 12 TABLET | Refills: 0 | Status: SHIPPED | OUTPATIENT
Start: 2025-01-27 | End: 2025-01-30

## 2025-01-27 RX ORDER — IBUPROFEN 600 MG/1
600 TABLET, FILM COATED ORAL EVERY 6 HOURS PRN
Qty: 28 TABLET | Refills: 0 | Status: SHIPPED | OUTPATIENT
Start: 2025-01-27 | End: 2025-02-03

## 2025-01-27 RX ORDER — ACETAMINOPHEN 500 MG
1000 TABLET ORAL
Status: COMPLETED | OUTPATIENT
Start: 2025-01-27 | End: 2025-01-27

## 2025-01-27 RX ORDER — IBUPROFEN 600 MG/1
600 TABLET, FILM COATED ORAL
Status: COMPLETED | OUTPATIENT
Start: 2025-01-27 | End: 2025-01-27

## 2025-01-27 RX ORDER — IOPAMIDOL 755 MG/ML
100 INJECTION, SOLUTION INTRAVASCULAR
Status: COMPLETED | OUTPATIENT
Start: 2025-01-27 | End: 2025-01-27

## 2025-01-27 RX ORDER — LIDOCAINE 4 G/G
1 PATCH TOPICAL
Status: DISCONTINUED | OUTPATIENT
Start: 2025-01-27 | End: 2025-01-27 | Stop reason: HOSPADM

## 2025-01-27 RX ADMIN — IOPAMIDOL 100 ML: 755 INJECTION, SOLUTION INTRAVENOUS at 10:54

## 2025-01-27 RX ADMIN — ACETAMINOPHEN 1000 MG: 500 TABLET ORAL at 11:23

## 2025-01-27 RX ADMIN — IBUPROFEN 600 MG: 600 TABLET, FILM COATED ORAL at 11:23

## 2025-01-27 ASSESSMENT — PAIN DESCRIPTION - LOCATION: LOCATION: ABDOMEN;RIB CAGE

## 2025-01-27 ASSESSMENT — PAIN DESCRIPTION - ORIENTATION: ORIENTATION: LEFT

## 2025-01-27 ASSESSMENT — PAIN - FUNCTIONAL ASSESSMENT
PAIN_FUNCTIONAL_ASSESSMENT: 0-10
PAIN_FUNCTIONAL_ASSESSMENT: 0-10

## 2025-01-27 ASSESSMENT — LIFESTYLE VARIABLES
HOW MANY STANDARD DRINKS CONTAINING ALCOHOL DO YOU HAVE ON A TYPICAL DAY: PATIENT DOES NOT DRINK
HOW OFTEN DO YOU HAVE A DRINK CONTAINING ALCOHOL: NEVER

## 2025-01-27 ASSESSMENT — PAIN SCALES - GENERAL
PAINLEVEL_OUTOF10: 8
PAINLEVEL_OUTOF10: 8

## 2025-01-27 NOTE — DISCHARGE INSTRUCTIONS
Patient Instructions:  Condition Overview:  Epiploic appendagitis is an inflammation of small, fatty structures (epiploic appendages) attached to your colon. It may cause localized abdominal pain, but it is a self-limiting condition that typically resolves on its own.  Rest and Recovery:  Avoid strenuous activities or heavy lifting until your pain improves.  Rest as needed to help your body recover.  Pain Management:  Over-the-counter pain relievers, such as ibuprofen (Advil) or acetaminophen (Tylenol), can be used to control pain.  Take these as directed on the package or as recommended by your doctor.  Dietary Adjustments:  Eat a light, balanced diet to avoid putting strain on your digestive system.  Drink plenty of fluids to stay hydrated.  Avoid foods that might cause gas or bloating.  Monitor Symptoms:  Your pain should gradually improve over the next few days to weeks.  If your pain worsens, or if you experience fever, nausea, vomiting, or changes in bowel movements, contact your doctor immediately.  Follow-Up Care:  Follow up with your doctor as advised to ensure proper recovery.  Imaging or additional tests may be needed if your symptoms do not improve.

## 2025-01-27 NOTE — ED PROVIDER NOTES
[facility] EMERGENCY DEPARTMENT  EMERGENCY DEPARTMENT HISTORY AND PHYSICAL EXAM      Date: 1/27/2025  Patient Name: Slava Husain  MRN: [id number]  Birthdate 1955  Date of evaluation: 1/27/2025  Provider: Henry Chapman DO   Note Started: 9:26 AM EST 1/27/25    HISTORY OF PRESENT ILLNESS     Chief Complaint   Patient presents with    Abdominal Pain    Rib Pain     History Provided By: Patient    HPI: Slava Husain is a 69-year-old male with a past medical history of hepatocellular carcinoma (HCC), prostate cancer treated with radiation therapy in 2019, hypertension, and sleep apnea who presents with abdominal pain and rib pain. He reports that the pain began yesterday morning upon waking and rolling over in bed, localized below his left ribs. He denies any chest injury but has noted a mild cough, pain with breathing, and swelling in the area without bruising. The pain is described as relatively severe.    PAST MEDICAL HISTORY   Past Medical History:  Past Medical History:   Diagnosis Date    Cancer (HCC)     Prostate 2019 with radiation therapy    Hypertension     Sleep apnea        Past Surgical History:  No past surgical history on file.    Family History:  No family history on file.    Social History:  Social History     Tobacco Use    Smoking status: Former    Smokeless tobacco: Never    Tobacco comments:     Quit smoking: smoked 50 years ago   Substance Use Topics    Alcohol use: Yes     Comment: 2 beers a week if that    Drug use: Never       Allergies:  No Known Allergies    PCP: Alex Cross APRN - NP    Current Meds:   Current Facility-Administered Medications   Medication Dose Route Frequency Provider Last Rate Last Admin    lidocaine 4 % external patch 1 patch  1 patch TransDERmal NOW Henry Chapman DO   1 patch at 01/27/25 1123     Current Outpatient Medications   Medication Sig Dispense Refill    ibuprofen (IBU) 600 MG tablet Take 1 tablet by mouth every 6 hours as needed

## 2025-01-27 NOTE — ED TRIAGE NOTES
Reports that when he woke up yesterday morning & rolled over, he began having pain below his ribs on the left lateral side.  Denies injury to chest.  Has had a cough for a few days as well.  Reports pain with breathing.  Swelling noted to area as well.  No bruising.  
No

## 2025-01-27 NOTE — ED NOTES
Discharge instructions provided. Pt was given copy of discharge instructions with 0 paper script(s) and 2 electronic script(s). Pt verbalized understanding of the medication instructions, and the importance of following up as recommended by EDP. Pt has no further questions at this time. Wheelchair offered from treatment area to hospital entrance, pt declined. Pt leaving ED ambulatory and in stable condition.